# Patient Record
Sex: FEMALE | URBAN - METROPOLITAN AREA
[De-identification: names, ages, dates, MRNs, and addresses within clinical notes are randomized per-mention and may not be internally consistent; named-entity substitution may affect disease eponyms.]

---

## 2021-12-13 ENCOUNTER — OFFICE VISIT (OUTPATIENT)
Dept: URBAN - METROPOLITAN AREA CLINIC 44 | Facility: CLINIC | Age: 41
End: 2021-12-13
Payer: COMMERCIAL

## 2021-12-13 DIAGNOSIS — H01.111 ALLERGIC DERMATITIS OF RIGHT UPPER EYELID: ICD-10-CM

## 2021-12-13 DIAGNOSIS — H04.123 TEAR FILM INSUFFICIENCY OF BILATERAL LACRIMAL GLANDS: Primary | ICD-10-CM

## 2021-12-13 DIAGNOSIS — H00.011 HORDEOLUM EXTERNUM RIGHT UPPER EYELID: ICD-10-CM

## 2021-12-13 PROCEDURE — 99204 OFFICE O/P NEW MOD 45 MIN: CPT | Performed by: OPTOMETRIST

## 2021-12-13 RX ORDER — TRIAMCINOLONE ACETONIDE 1 MG/G
0.1 % OINTMENT TOPICAL
Qty: 5 | Refills: 0 | Status: ACTIVE
Start: 2021-12-13

## 2021-12-13 RX ORDER — TRIAMCINOLONE ACETONIDE 1 MG/G
0.1 % OINTMENT TOPICAL
Qty: 5 | Refills: 0 | Status: INACTIVE
Start: 2021-12-13 | End: 2021-12-13

## 2021-12-13 NOTE — IMPRESSION/PLAN
Impression: Hordeolum externum right upper eyelid: H00.011. Mild Plan: PLAN: Discussed findings. Rec Hot compresses 4 X for 10 min per day for 10 days. RTC PRN or worsens.

## 2021-12-13 NOTE — IMPRESSION/PLAN
Impression: Tear film insufficiency of bilateral lacrimal glands: H04.123. Clinical evaluation shows mild DED signs. Patient reports no or occasional symptoms not interfering with daily activities. Plan: PLAN: Warm compresses for 10 minutes AM (Samuel Mask or equal). Recommend Lipid based tears to be used 4X daily. Recommend Pataday for itching RTC if symptom's worsen.